# Patient Record
Sex: FEMALE | Race: WHITE
[De-identification: names, ages, dates, MRNs, and addresses within clinical notes are randomized per-mention and may not be internally consistent; named-entity substitution may affect disease eponyms.]

---

## 2019-11-20 ENCOUNTER — HOSPITAL ENCOUNTER (EMERGENCY)
Dept: HOSPITAL 26 - MED | Age: 44
Discharge: HOME | End: 2019-11-20
Payer: MEDICAID

## 2019-11-20 VITALS — BODY MASS INDEX: 29.07 KG/M2 | WEIGHT: 154 LBS | HEIGHT: 61 IN

## 2019-11-20 VITALS — SYSTOLIC BLOOD PRESSURE: 202 MMHG | DIASTOLIC BLOOD PRESSURE: 99 MMHG

## 2019-11-20 VITALS — DIASTOLIC BLOOD PRESSURE: 83 MMHG | SYSTOLIC BLOOD PRESSURE: 148 MMHG

## 2019-11-20 DIAGNOSIS — Z79.84: ICD-10-CM

## 2019-11-20 DIAGNOSIS — F15.10: Primary | ICD-10-CM

## 2019-11-20 DIAGNOSIS — E11.9: ICD-10-CM

## 2019-11-20 PROCEDURE — 82948 REAGENT STRIP/BLOOD GLUCOSE: CPT

## 2019-11-20 PROCEDURE — 96372 THER/PROPH/DIAG INJ SC/IM: CPT

## 2019-11-20 PROCEDURE — 99283 EMERGENCY DEPT VISIT LOW MDM: CPT

## 2019-11-20 NOTE — NUR
Patient discharged with v/s stable. Written and verbal after care instructions 
given and explained. 

Patient alert, oriented and verbalized understanding of instructions. 
Ambulatory with steady gait. All questions addressed prior to discharge. ID 
band removed. Patient advised to follow up with PMD. Rx of XANAX WAS given. 
Patient educated on indication of medication including possible reaction and 
side effects. Opportunity to ask questions provided and answered.

PT STATED THAT HER ANXIETY HAD DECREASED 2/10. PT HAD NO PAIN 0/10 PRIOR TO D/C

## 2019-11-20 NOTE — NUR
43 Y/O F, PRESENTS TO ED WITH COMPLAINTS OF LEFT SIDED NUMBNESS. REPORTS 
NUMBNESS STARTED YESTERDAY IN THE EVENING. HX OF DM, REPORTS NONCOMPLIANCE OF 
MEDS FOR OVER 3 MONTHS. FACIAL SYMMETRY, STEADY GAIT, BILATERAL HAND  ARE 
STRONG. REPORTS NUMBNESS IN FACE AND LEFT FOREARM, DENIES PAIN. PT REPORTS 
CRYSTAL METH ABUSE FOR ONE MONTH AND A HALF AND LAST USE WAS LAST NIGHT AT 5 IN 
THE EVENING. PT IS Italian SPEAKING AND STATES SHE WALKED TO ED FROM HOME. 
JULIA UPx1, WILL CONTINUE TO MONITOR.  

-------------------------------------------------------------------------------

Addendum: 11/20/19 at 0147 by [email or web address]

-------------------------------------------------------------------------------

BLOOD [id number]

## 2020-04-15 ENCOUNTER — HOSPITAL ENCOUNTER (EMERGENCY)
Dept: HOSPITAL 26 - MED | Age: 45
Discharge: HOME | End: 2020-04-15
Payer: MEDICAID

## 2020-04-15 VITALS
WEIGHT: 130 LBS | DIASTOLIC BLOOD PRESSURE: 101 MMHG | SYSTOLIC BLOOD PRESSURE: 162 MMHG | HEIGHT: 61 IN | BODY MASS INDEX: 24.55 KG/M2

## 2020-04-15 VITALS — SYSTOLIC BLOOD PRESSURE: 156 MMHG | DIASTOLIC BLOOD PRESSURE: 72 MMHG

## 2020-04-15 DIAGNOSIS — Z79.84: ICD-10-CM

## 2020-04-15 DIAGNOSIS — Z03.818: Primary | ICD-10-CM

## 2020-04-15 DIAGNOSIS — E11.9: ICD-10-CM

## 2020-04-15 DIAGNOSIS — J10.1: ICD-10-CM

## 2020-04-15 DIAGNOSIS — B34.9: ICD-10-CM
